# Patient Record
Sex: FEMALE | Race: WHITE | NOT HISPANIC OR LATINO | Employment: FULL TIME | URBAN - METROPOLITAN AREA
[De-identification: names, ages, dates, MRNs, and addresses within clinical notes are randomized per-mention and may not be internally consistent; named-entity substitution may affect disease eponyms.]

---

## 2017-02-14 ENCOUNTER — TELEPHONE (OUTPATIENT)
Dept: OBSTETRICS AND GYNECOLOGY | Facility: CLINIC | Age: 22
End: 2017-02-14

## 2017-02-14 RX ORDER — DESOGESTREL AND ETHINYL ESTRADIOL 21-5 (28)
1 KIT ORAL DAILY
Qty: 28 TABLET | Refills: 12 | Status: SHIPPED | OUTPATIENT
Start: 2017-02-14 | End: 2018-02-14

## 2018-06-11 ENCOUNTER — PROCEDURE VISIT (OUTPATIENT)
Dept: OBSTETRICS AND GYNECOLOGY | Facility: CLINIC | Age: 23
End: 2018-06-11

## 2018-06-11 VITALS — SYSTOLIC BLOOD PRESSURE: 108 MMHG | WEIGHT: 126 LBS | BODY MASS INDEX: 22.32 KG/M2 | DIASTOLIC BLOOD PRESSURE: 67 MMHG

## 2018-06-11 DIAGNOSIS — Z01.419 WOMEN'S ANNUAL ROUTINE GYNECOLOGICAL EXAMINATION: Primary | ICD-10-CM

## 2018-06-11 PROCEDURE — 99395 PREV VISIT EST AGE 18-39: CPT | Performed by: OBSTETRICS & GYNECOLOGY

## 2018-06-11 RX ORDER — NORETHINDRONE ACETATE AND ETHINYL ESTRADIOL, AND FERROUS FUMARATE 1MG-20(24)
1 KIT ORAL DAILY
Qty: 28 CAPSULE | Refills: 12 | Status: SHIPPED | OUTPATIENT
Start: 2018-06-11 | End: 2019-11-14 | Stop reason: SDUPTHER

## 2018-06-11 NOTE — PATIENT INSTRUCTIONS
Breast Self-Awareness  Breast self-awareness means:  · Knowing how your breasts look.  · Knowing how your breasts feel.  · Checking your breasts every month for changes.  · Telling your doctor if you notice a change in your breasts.  Breast self-awareness allows you to notice a breast problem early while it is still small.  How to do a breast self-exam  One way to learn what is normal for your breasts and to check for changes is to do a breast self-exam. To do a breast self-exam:  Look for Changes     1. Take off all the clothes above your waist.  2.  front of a mirror in a room with good lighting.  3. Put your hands on your hips.  4. Push your hands down.  5. Look at your breasts and nipples in the mirror to see if one breast or nipple looks different than the other. Check to see if:  ¨ The shape of one breast is different.  ¨ The size of one breast is different.  ¨ There are wrinkles, dips, and bumps in one breast and not the other.  6. Look at each breast for changes in your skin, such as:  ¨ Redness.  ¨ Scaly areas.  7. Look for changes in your nipples, such as:  ¨ Liquid around the nipples.  ¨ Bleeding.  ¨ Dimpling.  ¨ Redness.  ¨ A change in where the nipples are.  Feel for Changes   1. Lie on your back on the floor.  2. Feel each breast. To do this, follow these steps:  ¨ Pick a breast to feel.  ¨ Put the arm closest to that breast above your head.  ¨ Use your other arm to feel the nipple area of your breast. Feel the area with the pads of your three middle fingers by making small circles with your fingers. For the first [place], press lightly. For the second [place], press harder. For the third [place], press even harder.  ¨ Keep making circles with your fingers at the light, harder, and even harder pressures as you move down your breast. Stop when you feel your ribs.  ¨ Move your fingers a little toward the center of your body.  ¨ Start making circles with your fingers again, this time going up until  you reach your collarbone.  ¨ Keep making up and down circles until you reach your armpit. Remember to keep using the three pressures.  ¨ Feel the other breast in the same way.  3. Sit or  the shower or tub.  4. With soapy water on your skin, feel each breast the same way you did in step 2, when you were lying on the floor.  Write Down What You Find     After doing the self-exam, write down:  · What is normal for each breast.  · Any changes you find in each breast.  · When you last had your period.  How often should I check my breasts?  Check your breasts every month. If you are breastfeeding, the best time to check them is after you feed your baby or after you use a breast pump. If you get periods, the best time to check your breasts is 5-7 days after your period is over.  When should I see my doctor?  See your doctor if you notice:  · A change in shape or size of your breasts or nipples.  · A change in the skin of your breast or nipples, such as red or scaly skin.  · Unusual fluid coming from your nipples.  · A lump or thick area that was not there before.  · Pain in your breasts.  · Anything that concerns you.  This information is not intended to replace advice given to you by your health care provider. Make sure you discuss any questions you have with your health care provider.  Document Released: 06/05/2009 Document Revised: 05/25/2017 Document Reviewed: 11/06/2016  Elsevier Interactive Patient Education © 2017 [employer]    Breast Self-Awareness  Breast self-awareness means:  · Knowing how your breasts look.  · Knowing how your breasts feel.  · Checking your breasts every month for changes.  · Telling your doctor if you notice a change in your breasts.  Breast self-awareness allows you to notice a breast problem early while it is still small.  How to do a breast self-exam  One way to learn what is normal for your breasts and to check for changes is to do a breast self-exam. To do a breast  self-exam:  Look for Changes     8. Take off all the clothes above your waist.  9.  front of a mirror in a room with good lighting.  10. Put your hands on your hips.  11. Push your hands down.  12. Look at your breasts and nipples in the mirror to see if one breast or nipple looks different than the other. Check to see if:  ¨ The shape of one breast is different.  ¨ The size of one breast is different.  ¨ There are wrinkles, dips, and bumps in one breast and not the other.  13. Look at each breast for changes in your skin, such as:  ¨ Redness.  ¨ Scaly areas.  14. Look for changes in your nipples, such as:  ¨ Liquid around the nipples.  ¨ Bleeding.  ¨ Dimpling.  ¨ Redness.  ¨ A change in where the nipples are.  Feel for Changes   5. Lie on your back on the floor.  6. Feel each breast. To do this, follow these steps:  ¨ Pick a breast to feel.  ¨ Put the arm closest to that breast above your head.  ¨ Use your other arm to feel the nipple area of your breast. Feel the area with the pads of your three middle fingers by making small circles with your fingers. For the first [place], press lightly. For the second [place], press harder. For the third [place], press even harder.  ¨ Keep making circles with your fingers at the light, harder, and even harder pressures as you move down your breast. Stop when you feel your ribs.  ¨ Move your fingers a little toward the center of your body.  ¨ Start making circles with your fingers again, this time going up until you reach your collarbone.  ¨ Keep making up and down circles until you reach your armpit. Remember to keep using the three pressures.  ¨ Feel the other breast in the same way.  7. Sit or  the shower or tub.  8. With soapy water on your skin, feel each breast the same way you did in step 2, when you were lying on the floor.  Write Down What You Find     After doing the self-exam, write down:  · What is normal for each breast.  · Any changes you find in each  breast.  · When you last had your period.  How often should I check my breasts?  Check your breasts every month. If you are breastfeeding, the best time to check them is after you feed your baby or after you use a breast pump. If you get periods, the best time to check your breasts is 5-7 days after your period is over.  When should I see my doctor?  See your doctor if you notice:  · A change in shape or size of your breasts or nipples.  · A change in the skin of your breast or nipples, such as red or scaly skin.  · Unusual fluid coming from your nipples.  · A lump or thick area that was not there before.  · Pain in your breasts.  · Anything that concerns you.  This information is not intended to replace advice given to you by your health care provider. Make sure you discuss any questions you have with your health care provider.  Document Released: 06/05/2009 Document Revised: 05/25/2017 Document Reviewed: 11/06/2016  Elsevier Interactive Patient Education © 2017 [employer]

## 2018-06-14 ENCOUNTER — TELEPHONE (OUTPATIENT)
Dept: OBSTETRICS AND GYNECOLOGY | Facility: CLINIC | Age: 23
End: 2018-06-14

## 2018-06-14 NOTE — TELEPHONE ENCOUNTER
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          ----- Message from Joselito Carlton MD sent at 6/14/2018 12:40 PM EDT -----  Advised patient of the results.  Recommend repeat Pap smear 1 year  ----- Message -----  From: Zulema Hoover Rep  Sent: 6/14/2018   9:09 AM  To: Joselito Carlton MD

## 2019-03-15 ENCOUNTER — TELEPHONE (OUTPATIENT)
Dept: OBSTETRICS AND GYNECOLOGY | Facility: CLINIC | Age: 24
End: 2019-03-15

## 2019-03-16 NOTE — TELEPHONE ENCOUNTER
We can do this, But she is already on a very low dose pill and she might lose cycle control. Can she stop by the office to check her weight and document this before changing the medication please

## 2019-03-19 NOTE — TELEPHONE ENCOUNTER
Patient advised that she is already on a low dose pill.  We could monitor her weight if she could stop by the office before changing medication.      Patient states she will continue pill and will monitor her weight and pms symptom until her appt in June.  Her weight is currently 139 and up 12 pounds since last year

## 2019-10-14 ENCOUNTER — TELEPHONE (OUTPATIENT)
Dept: OBSTETRICS AND GYNECOLOGY | Facility: CLINIC | Age: 24
End: 2019-10-14

## 2019-10-14 NOTE — TELEPHONE ENCOUNTER
Patient will try discount card to see if co payment is affordable before we change to different generic birth control

## 2019-10-14 NOTE — TELEPHONE ENCOUNTER
Brandt pt called stating the current birth control she is on is not covered by insurance and is needing to change to something with a generic. Please contact back

## 2019-11-14 ENCOUNTER — OFFICE VISIT (OUTPATIENT)
Dept: OBSTETRICS AND GYNECOLOGY | Facility: CLINIC | Age: 24
End: 2019-11-14

## 2019-11-14 VITALS
SYSTOLIC BLOOD PRESSURE: 110 MMHG | DIASTOLIC BLOOD PRESSURE: 68 MMHG | WEIGHT: 141 LBS | BODY MASS INDEX: 24.98 KG/M2 | HEIGHT: 63 IN

## 2019-11-14 DIAGNOSIS — Z01.419 WOMEN'S ANNUAL ROUTINE GYNECOLOGICAL EXAMINATION: Primary | ICD-10-CM

## 2019-11-14 PROCEDURE — 99395 PREV VISIT EST AGE 18-39: CPT | Performed by: OBSTETRICS & GYNECOLOGY

## 2019-11-14 RX ORDER — NORETHINDRONE ACETATE AND ETHINYL ESTRADIOL, AND FERROUS FUMARATE 1MG-20(24)
1 KIT ORAL DAILY
Qty: 84 CAPSULE | Refills: 3 | Status: SHIPPED | OUTPATIENT
Start: 2019-11-14 | End: 2019-12-04 | Stop reason: CLARIF

## 2019-11-14 NOTE — PROGRESS NOTES
"Subjective     Chief Complaint   Patient presents with   • Gynecologic Exam     pap smear. no complaints       Ledy Ledesma is a 24 y.o. year old  presenting to be seen for her annual exam.      Her LMP was Patient's last menstrual period was 10/24/2019..  Her cycles are regular, predictable and consistent every 28 - 32 days  usually monthly.  Usually her flow is typically light with no more than 0 days of heavy flow each menses.   Additionally she describes no other symptoms associated with her menses.    She is sexually active.  In the past 12 months there have not been new sexual partners.  Condoms are not typically used.  She would not like to be screened for STD's at today's exam.     Current contraceptive methods being used: OCP (estrogen/progesterone).  In the coming year, she is not considering changing her current contraceptive method.    She exercises regularly: no.  She wears her seat belt: yes.  She has concerns about domestic violence: no.    GYN screening history:  · Last pap: she reports her last PAP was normal.    No Additional Complaints Reported    The following portions of the patient's history were reviewed and updated as appropriate:vital signs, allergies, current medications, past medical history, past social history, past surgical history and problem list.    Review of Systems  Pertinent items are noted in HPI.     Physical Exam    Objective     /68   Ht 160 cm (63\")   Wt 64 kg (141 lb)   LMP 10/24/2019   Breastfeeding? No   BMI 24.98 kg/m²       General:  well developed; well nourished  no acute distress   Constitutional: healthy   Skin:  No suspicious lesions seen   Thyroid: normal to inspection and palpation   Lungs:  breathing is unlabored  clear to auscultation bilaterally   Heart:  regular rate and rhythm, S1, S2 normal, no murmur, click, rub or gallop   Breasts:  Examined in supine position  Symmetric without masses or skin dimpling  Nipples normal without inversion, " lesions or discharge  There are no palpable axillary nodes   Abdomen: soft, non-tender; no masses  no umbilical or inginual hernias are present  no hepato-splenomegaly   Pelvis: Clinical staff was present for exam  External genitalia:  normal appearance of the external genitalia including Bartholin's and Timberlake's glands.  :  urethral meatus normal; urethral hypermobility is absent.  Vaginal:  normal pink mucosa without prolapse or lesions.  Cervix:  normal appearance  Uterus:  normal size, shape and consistency  Adnexa:  normal bimanual exam of the adnexa.   Musculoskeletal: negative   Neuro: normal without focal findings, mental status, speech normal, alert and oriented x3 and CARLA   Psych: oriented to time, place and person, mood and affect are within normal limits, pt is a good historian; no memory problems were noted       Lab Review   No data reviewed    Imaging  No data reviewed    Assessment/Plan     ASSESSMENT  1. Women's annual routine gynecological examination        PLAN  No orders of the defined types were placed in this encounter.    New Medications Ordered This Visit   Medications   • Norethin Ace-Eth Estrad-FE (TAYTULLA) 1-20 MG-MCG(24) capsule     Sig: Take 1 tablet by mouth Daily.     Dispense:  84 capsule     Refill:  3         Follow up: 1 year(s)         This note was electronically signed.    Joselito Carlton MD  November 14, 2019

## 2019-12-03 ENCOUNTER — TELEPHONE (OUTPATIENT)
Dept: OBSTETRICS AND GYNECOLOGY | Facility: CLINIC | Age: 24
End: 2019-12-03

## 2019-12-04 RX ORDER — NORETHINDRONE ACETATE AND ETHINYL ESTRADIOL AND FERROUS FUMARATE 1MG-20(24)
1 KIT ORAL DAILY
Qty: 28 TABLET | Refills: 12 | Status: SHIPPED | OUTPATIENT
Start: 2019-12-04 | End: 2020-12-03

## 2019-12-04 RX ORDER — NORETHINDRONE ACETATE AND ETHINYL ESTRADIOL 1MG-20(21)
1 KIT ORAL DAILY
Qty: 84 TABLET | Refills: 2 | Status: SHIPPED | OUTPATIENT
Start: 2019-12-04 | End: 2020-12-03

## 2019-12-04 NOTE — TELEPHONE ENCOUNTER
Generic prescribed. If she has NO insurance at all, she needs to talk to the pharmacist about the least expensive OCP available or get samples from the office

## 2020-11-24 ENCOUNTER — TELEPHONE (OUTPATIENT)
Dept: OBSTETRICS AND GYNECOLOGY | Facility: CLINIC | Age: 25
End: 2020-11-24

## 2020-11-25 ENCOUNTER — TELEPHONE (OUTPATIENT)
Dept: OBSTETRICS AND GYNECOLOGY | Facility: CLINIC | Age: 25
End: 2020-11-25

## 2021-04-21 ENCOUNTER — OFFICE VISIT (OUTPATIENT)
Dept: OBSTETRICS AND GYNECOLOGY | Facility: CLINIC | Age: 26
End: 2021-04-21

## 2021-04-21 VITALS
DIASTOLIC BLOOD PRESSURE: 80 MMHG | BODY MASS INDEX: 24.63 KG/M2 | WEIGHT: 139 LBS | SYSTOLIC BLOOD PRESSURE: 120 MMHG | HEIGHT: 63 IN

## 2021-04-21 DIAGNOSIS — Z01.419 WELL WOMAN EXAM WITH ROUTINE GYNECOLOGICAL EXAM: Primary | ICD-10-CM

## 2021-04-21 PROCEDURE — 99385 PREV VISIT NEW AGE 18-39: CPT | Performed by: OBSTETRICS & GYNECOLOGY

## 2021-04-21 NOTE — PROGRESS NOTES
GYN Annual Exam     CC - Here for annual exam.     Subjective   HPI  Ledy Oakley is a 26 y.o. female, , who presents for annual well woman exam. Patient's last menstrual period was 2021..  Periods are regular lasting 5 days.  She reports severe dysmenorrhea. Patient reports problems with: constipation, diarrhea and nausea.  Partner Status: Marital Status: .  New Partners since last visit: no.  Desires STD Screening: no.      She stopped OCP's in July and is not actively trying to conceive but also not preventing pregnancy. She is taking a PNV.  Her periods have been heavy and very painful since she stopped OCP's.  She is concerned she may have endometriosis.     Additional OB/GYN History     Current contraception: contraceptive methods: None  Desires to: do not start contraception  Last Pap : ; neg.  HPV neg.   Last Completed Pap Smear       Status Date      PAP SMEAR No completions recorded        History of abnormal Pap smear: yes - .  Family history of uterine, colon, breast, or ovarian cancer: yes - PGM +breast cancer; MGM +colon ca  Previous Mammogram :  no  Performs monthly Self-Breast Exam: yes  Exercises Regularly:yes  Feelings of Anxiety or Depression: yes - some anxiety and depression. She has had talk therapy in the past but declines need for referral today.   Tobacco Usage?: No   OB History        0    Para   0    Term   0       0    AB   0    Living   0       SAB   0    TAB   0    Ectopic   0    Molar        Multiple   0    Live Births                    Health Maintenance   Topic Date Due   • HEPATITIS C SCREENING  Never done   • PAP SMEAR  Never done   • ANNUAL PHYSICAL  10/04/2017   • Annual Gynecologic Pelvic and Breast Exam  11/15/2020   • INFLUENZA VACCINE  2021   • TDAP/TD VACCINES (2 - Tdap) 10/30/2029   • COVID-19 Vaccine  Completed   • Pneumococcal Vaccine 0-64  Aged Out     [unfilled]      The additional following portions of the  "patient's history were reviewed and updated as appropriate: allergies, current medications, past family history, past medical history, past social history, past surgical history and problem list.    Review of Systems    I have reviewed and agree with the HPI, ROS, and historical information as entered above. Reanna Pulido MD    Objective   /80   Ht 160 cm (63\")   Wt 63 kg (139 lb)   LMP 04/13/2021   Breastfeeding No   BMI 24.62 kg/m²     Physical Exam  Vitals and nursing note reviewed. Exam conducted with a chaperone present.   Constitutional:       Appearance: She is well-developed.   HENT:      Head: Normocephalic and atraumatic.   Eyes:      Pupils: Pupils are equal, round, and reactive to light.   Neck:      Thyroid: No thyroid mass or thyromegaly.   Pulmonary:      Effort: Pulmonary effort is normal. No retractions.   Chest:      Chest wall: No mass.      Breasts:         Right: Normal. No mass, nipple discharge, skin change or tenderness.         Left: Normal. No mass, nipple discharge, skin change or tenderness.   Abdominal:      General: Bowel sounds are normal.      Palpations: Abdomen is soft. Abdomen is not rigid. There is no mass.      Tenderness: There is no abdominal tenderness. There is no guarding.      Hernia: No hernia is present. There is no hernia in the left inguinal area or right inguinal area.   Genitourinary:     Exam position: Lithotomy position.      Pubic Area: No rash.       Labia:         Right: No rash, tenderness or lesion.         Left: No rash, tenderness or lesion.       Urethra: No urethral pain or urethral swelling.      Vagina: Normal. No vaginal discharge or lesions.      Cervix: No cervical motion tenderness, discharge, lesion or cervical bleeding.      Uterus: Normal. Not enlarged, not fixed and not tender.       Adnexa:         Right: No mass, tenderness or fullness.          Left: No mass, tenderness or fullness.        Rectum: No external hemorrhoid. "   Musculoskeletal:      Cervical back: Normal range of motion. No muscular tenderness.      Right lower leg: No edema.      Left lower leg: No edema.   Skin:     General: Skin is warm and dry.   Neurological:      Mental Status: She is alert and oriented to person, place, and time.      Motor: Motor function is intact.   Psychiatric:         Mood and Affect: Mood and affect normal.         Behavior: Behavior normal.         Assessment/Plan       Encounter Diagnosis   Name Primary?   • Well woman exam with routine gynecological exam Yes       Plan     1. Recommended use of Vitamin D replacement and getting adequate calcium in her diet. (1500mg)  2. Reviewed monthly self breast exams.  Instructed to call with lumps, pain, or breast discharge.    3. Reviewed HPV guidelines.  4. Reviewed exercise as a preventative health measures.   5. Preconceptual counseling-  encouraged being on folic acid 1-4 mg.  Reviewed current medications.  Discussed optional preconceptual carrier testing including CF, SMA and Fragile X. Syndrome.   Understands to be up to date on vaccinations.  Information on Good Health Before Pregnancy given.      Reanna Pulido MD  04/21/2021

## 2021-04-22 DIAGNOSIS — Z01.419 WELL WOMAN EXAM WITH ROUTINE GYNECOLOGICAL EXAM: ICD-10-CM

## 2022-07-08 ENCOUNTER — TELEPHONE (OUTPATIENT)
Dept: OBSTETRICS AND GYNECOLOGY | Facility: CLINIC | Age: 27
End: 2022-07-08

## 2022-07-08 NOTE — TELEPHONE ENCOUNTER
Called and spoke with patient.  She states that she did have some abnormal bleeding that she experienced a couple days before the pain started however she did take a Plan B about a week ago so is unsure of exact date.  Patient states that the pain started right after having intercourse last night.  States that the ibuprofen has helped a small bit but really only when she was able to lay down and rest.     Routing to physician for advisement.

## 2022-07-08 NOTE — TELEPHONE ENCOUNTER
Provider: DR MARTINEZ  Caller: CELI SHARIF  Relationship to Patient: SELF  Pharmacy: [facility] [address] [place] [address] - [address] [facility] [phone] [facility] [phone] [facility]    Phone Number: [phone]  Reason for Call: PT STATES DIAGNOSED WITH ENDOMETRIOSIS IN THE LAST YEAR AND OVERNIGHT AFTER SEX SHE STARTED HAVING SEVERE PAIN. SHE IS NOT CURRENTLY ON BIRTH CONTROL. SHE DID TAKE PLAN B ABOUT A WEEK AGO.    When was the patient last seen: 11/14/19  When did it start: OVERNIGHT AFTER SEX  Where is it located: ABDOMINAL FEELING AROUND BLADDER AND OVARIES  Characteristics of symptom/severity: SEVERE  Timing- Is it constant or intermittent: CONSTANT  What makes it worse: BEARING DOWN TO URINATE OR STANDING  What therapies/medications have you tried: 1000MG IBUPROFEN, HEATING PAD

## 2022-07-08 NOTE — TELEPHONE ENCOUNTER
Patient denies vaginal bleeding. States pain has resolved with IBU and heating pad. States pain is similar to when she had a cyst rutpture. Appt and US scheduled for next Friday. Patient advised to go to nearest ED immediately if she begins having severe pain and/or vaginal bleeding that is saturating >/=1/pad or tampon  per hr, saturating through clothing, becomes dizziness or lightheaded. Pt VU.

## 2022-07-08 NOTE — TELEPHONE ENCOUNTER
As patient has most recently seen Dr. Pulido in [address]; routed to [address] [facility] for advisement per physician.

## 2022-07-14 DIAGNOSIS — R10.2 PELVIC PAIN: Primary | ICD-10-CM

## 2022-07-15 ENCOUNTER — OFFICE VISIT (OUTPATIENT)
Dept: OBSTETRICS AND GYNECOLOGY | Facility: CLINIC | Age: 27
End: 2022-07-15

## 2022-07-15 VITALS
SYSTOLIC BLOOD PRESSURE: 104 MMHG | HEIGHT: 63 IN | WEIGHT: 139.6 LBS | DIASTOLIC BLOOD PRESSURE: 72 MMHG | BODY MASS INDEX: 24.73 KG/M2

## 2022-07-15 DIAGNOSIS — Z01.419 PAP TEST, AS PART OF ROUTINE GYNECOLOGICAL EXAMINATION: Primary | ICD-10-CM

## 2022-07-15 DIAGNOSIS — Z30.011 ENCOUNTER FOR INITIAL PRESCRIPTION OF CONTRACEPTIVE PILLS: ICD-10-CM

## 2022-07-15 PROBLEM — N80.9 ENDOMETRIOSIS: Status: ACTIVE | Noted: 2021-08-12

## 2022-07-15 PROBLEM — N94.6 DYSMENORRHEA: Status: ACTIVE | Noted: 2021-08-12

## 2022-07-15 PROCEDURE — 99395 PREV VISIT EST AGE 18-39: CPT | Performed by: NURSE PRACTITIONER

## 2022-07-15 RX ORDER — LEVONORGESTREL AND ETHINYL ESTRADIOL 100-20(84)
1 KIT ORAL DAILY
Qty: 1 EACH | Refills: 3 | Status: SHIPPED | OUTPATIENT
Start: 2022-07-15 | End: 2022-10-07

## 2022-07-15 NOTE — PROGRESS NOTES
GYN Annual Exam     CC - Here for annual exam.        HPI  Ledy Oakley is a 27 y.o. female, , who presents for annual well woman exam. Patient's last menstrual period was 2022..  Periods are regular every 25-35 days, lasting 5 days. .  Dysmenorrhea:severe, occurring premenstrually and first 1-2 days of flow.  Patient reports problems with: Endometriosis diagnosed by laparoscopy within this last year.    Partner Status: Marital Status: .  She is sexually active. She has not had new partners.. STD testing recommendations have been explained to the patient and she does desire STD testing.    Patient stated that she wants to discuss birth control options. Patient stated that she wants to talk about her history with endometriosis.  She used OCP from age [age] and had very little dysmenorrhea. She was on a continuous pill and would like to begin that again.    Additional OB/GYN History   Current contraception: contraceptive methods: None  Desires to: start contraception  Last Pap : 2021. Result: Normal- HPV negative  Last Completed Pap Smear          Ordered - PAP SMEAR (Every 3 Years) Ordered on 7/15/2022    2021  Pap IG, HPV-hr              History of abnormal Pap smear: yes - History of Abnormal Pap  Family history of uterine, colon, breast, or ovarian cancer: yes - Maternal Grandmother has breast cancer and Paternal Grandmother has colon cancer  Performs monthly Self-Breast Exam: no  Exercises Regularly:yes  Feelings of Anxiety or Depression: yes - Anxiety and Depression  Tobacco Usage?: No   OB History        0    Para   0    Term   0       0    AB   0    Living   0       SAB   0    IAB   0    Ectopic   0    Molar        Multiple   0    Live Births                    Health Maintenance   Topic Date Due   • HEPATITIS C SCREENING  Never done   • ANNUAL PHYSICAL  10/04/2017   • INFLUENZA VACCINE  10/01/2022   • Annual Gynecologic Pelvic and Breast Exam  2023   •  "PAP SMEAR  04/22/2024   • TDAP/TD VACCINES (2 - Tdap) 10/30/2029   • COVID-19 Vaccine  Completed   • Pneumococcal Vaccine 0-64  Aged Out       The additional following portions of the patient's history were reviewed and updated as appropriate: allergies, current medications, past family history, past medical history, past social history and past surgical history.    Review of Systems   Constitutional: Negative.    Cardiovascular: Negative.    Gastrointestinal: Negative.    Genitourinary: Positive for menstrual problem ( dysmenorrhea).   Psychiatric/Behavioral: Negative.          I have reviewed and agree with the HPI, ROS, and historical information as entered above. Medina ZAPATA David, APRN    Objective   /72   Ht 160 cm (63\")   Wt 63.3 kg (139 lb 9.6 oz)   LMP 06/26/2022   Breastfeeding No   BMI 24.73 kg/m²     Physical Exam  Vitals and nursing note reviewed. Exam conducted with a chaperone present.   Constitutional:       Appearance: Normal appearance. She is well-developed and normal weight.   HENT:      Head: Normocephalic and atraumatic.   Neck:      Thyroid: No thyroid mass or thyromegaly.   Cardiovascular:      Rate and Rhythm: Normal rate and regular rhythm.      Heart sounds: No murmur heard.  Pulmonary:      Effort: Pulmonary effort is normal. No retractions.      Breath sounds: Normal breath sounds. No wheezing, rhonchi or rales.   Chest:      Chest wall: No mass or tenderness.   Breasts:      Right: Normal. No mass, nipple discharge, skin change or tenderness.      Left: Normal. No mass, nipple discharge, skin change or tenderness.       Abdominal:      Palpations: Abdomen is soft. Abdomen is not rigid. There is no mass.      Tenderness: There is no abdominal tenderness. There is no guarding.      Hernia: No hernia is present.   Genitourinary:     General: Normal vulva.      Exam position: Lithotomy position.      Labia:         Right: No rash, tenderness or lesion.         Left: No rash, " tenderness or lesion.       Vagina: Normal. No vaginal discharge or lesions.      Cervix: No cervical motion tenderness, discharge, lesion or cervical bleeding.      Uterus: Normal. Not enlarged, not fixed and not tender.       Adnexa: Right adnexa normal and left adnexa normal.        Right: No mass or tenderness.          Left: No mass or tenderness.        Rectum: Normal. No external hemorrhoid.   Musculoskeletal:      Cervical back: Normal range of motion. No muscular tenderness.   Neurological:      Mental Status: She is alert and oriented to person, place, and time.   Psychiatric:         Behavior: Behavior normal.            Assessment and Plan    Problem List Items Addressed This Visit    None     Visit Diagnoses     Pap test, as part of routine gynecological examination    -  Primary    Relevant Orders    LIQUID-BASED PAP SMEAR, [facility] ([facility])    Encounter for initial prescription of contraceptive pills        Relevant Medications    Levonorgest-Eth Estrad 91-Day (Camrese Lo) 0.1-0.02 & 0.01 MG tablet          1. GYN annual well woman exam.   2. TVUS today showed emc 2.7, unterine fibroid measuring 9.8 mm x 7.8 mm x 10.6 mm. Right ovary multifollicular with small nodular area within right ovary measuring 7mm x 8mm x 7mm. Left ovary multifollicular. No free fluid. Will follow  3. Pt would like to start continuous BC. Camrese Lo Rx sent.  4. FU annually and prn      Medina Hernandez, APRN  07/15/2022

## 2022-07-25 LAB — REF LAB TEST METHOD: NORMAL
